# Patient Record
Sex: FEMALE | Race: OTHER | HISPANIC OR LATINO
[De-identification: names, ages, dates, MRNs, and addresses within clinical notes are randomized per-mention and may not be internally consistent; named-entity substitution may affect disease eponyms.]

---

## 2018-10-29 ENCOUNTER — APPOINTMENT (OUTPATIENT)
Dept: ANTEPARTUM | Facility: CLINIC | Age: 43
End: 2018-10-29
Payer: MEDICAID

## 2018-10-29 ENCOUNTER — ASOB RESULT (OUTPATIENT)
Age: 43
End: 2018-10-29

## 2018-10-29 PROBLEM — Z00.00 ENCOUNTER FOR PREVENTIVE HEALTH EXAMINATION: Status: ACTIVE | Noted: 2018-10-29

## 2018-10-29 PROCEDURE — 76856 US EXAM PELVIC COMPLETE: CPT

## 2019-05-16 ENCOUNTER — APPOINTMENT (OUTPATIENT)
Dept: PLASTIC SURGERY | Facility: CLINIC | Age: 44
End: 2019-05-16
Payer: MEDICAID

## 2019-05-16 VITALS — WEIGHT: 165 LBS | BODY MASS INDEX: 28.17 KG/M2 | HEIGHT: 64 IN

## 2019-05-16 DIAGNOSIS — Z78.9 OTHER SPECIFIED HEALTH STATUS: ICD-10-CM

## 2019-05-16 DIAGNOSIS — R73.03 PREDIABETES.: ICD-10-CM

## 2019-05-16 PROCEDURE — 99204 OFFICE O/P NEW MOD 45 MIN: CPT

## 2019-05-16 RX ORDER — METFORMIN HYDROCHLORIDE 625 MG/1
TABLET ORAL
Refills: 0 | Status: ACTIVE | COMMUNITY

## 2019-05-16 RX ORDER — SPIRONOLACTONE 50 MG/1
TABLET ORAL
Refills: 0 | Status: ACTIVE | COMMUNITY

## 2019-05-16 RX ORDER — LIRAGLUTIDE 6 MG/ML
INJECTION SUBCUTANEOUS
Refills: 0 | Status: ACTIVE | COMMUNITY

## 2019-05-18 NOTE — REASON FOR VISIT
[Consultation] : a consultation visit [FreeTextEntry1] : patient presents for a breast reconstruction consultation.

## 2019-05-18 NOTE — HISTORY OF PRESENT ILLNESS
[FreeTextEntry1] : 42 yo female presents for a breast reconstruction consultation.  The patient states she had a mammogram/sonogram followed by a biopsy two weeks ago that resulted in diagnosis of right breast cancer, IDC.  She states she went for an MRI today and results are pending.  She denies previous personal or family history of breast cancer.  Following discussion with her breast surgeon, she is considering lumpectomy vs. mastectomy, possible bilateral nipple-sparing.  She states she is overall healthy other than prediabetes.  She takes metformin, Victoza, and spironolactone.  She has previous surgical history of abdominal liposuction.  She has no children.  She does not smoke.

## 2019-05-18 NOTE — PHYSICAL EXAM
[NI] : Normal [de-identified] : sternal notch to nipple on the left is 25 cm and 24 cm on the right, the nipple to IMF is 10 cm on the left and 9.5 cm on the right, the base width is 13.5 cm bilaterally, there is grade 1 ptosis bilaterally.  The right IMF is lower than the left IMF.

## 2019-06-04 ENCOUNTER — RESULT REVIEW (OUTPATIENT)
Age: 44
End: 2019-06-04

## 2019-06-04 ENCOUNTER — OUTPATIENT (OUTPATIENT)
Dept: OUTPATIENT SERVICES | Facility: HOSPITAL | Age: 44
LOS: 1 days | Discharge: HOME | End: 2019-06-04
Payer: MEDICAID

## 2019-06-04 DIAGNOSIS — C50.919 MALIGNANT NEOPLASM OF UNSPECIFIED SITE OF UNSPECIFIED FEMALE BREAST: ICD-10-CM

## 2019-06-04 PROCEDURE — 88321 CONSLTJ&REPRT SLD PREP ELSWR: CPT

## 2019-06-07 ENCOUNTER — OUTPATIENT (OUTPATIENT)
Dept: OUTPATIENT SERVICES | Facility: HOSPITAL | Age: 44
LOS: 1 days | Discharge: HOME | End: 2019-06-07
Payer: MEDICAID

## 2019-06-07 VITALS
RESPIRATION RATE: 16 BRPM | TEMPERATURE: 98 F | OXYGEN SATURATION: 100 % | HEART RATE: 74 BPM | SYSTOLIC BLOOD PRESSURE: 103 MMHG | DIASTOLIC BLOOD PRESSURE: 75 MMHG

## 2019-06-07 VITALS
SYSTOLIC BLOOD PRESSURE: 100 MMHG | HEART RATE: 75 BPM | DIASTOLIC BLOOD PRESSURE: 63 MMHG | OXYGEN SATURATION: 99 % | WEIGHT: 156.09 LBS | TEMPERATURE: 98 F | RESPIRATION RATE: 15 BRPM

## 2019-06-07 LAB
GLUCOSE BLDC GLUCOMTR-MCNC: 80 MG/DL — SIGNIFICANT CHANGE UP (ref 70–99)
HCG UR QL: NEGATIVE — SIGNIFICANT CHANGE UP
SURGICAL PATHOLOGY STUDY: SIGNIFICANT CHANGE UP

## 2019-06-07 PROCEDURE — 36561 INSERT TUNNELED CV CATH: CPT

## 2019-06-07 PROCEDURE — 77001 FLUOROGUIDE FOR VEIN DEVICE: CPT | Mod: 26

## 2019-06-07 PROCEDURE — 76937 US GUIDE VASCULAR ACCESS: CPT | Mod: 26

## 2019-06-07 RX ORDER — SODIUM CHLORIDE 9 MG/ML
1000 INJECTION INTRAMUSCULAR; INTRAVENOUS; SUBCUTANEOUS
Refills: 0 | Status: DISCONTINUED | OUTPATIENT
Start: 2019-06-07 | End: 2019-06-22

## 2019-06-07 RX ORDER — ONDANSETRON 8 MG/1
4 TABLET, FILM COATED ORAL ONCE
Refills: 0 | Status: DISCONTINUED | OUTPATIENT
Start: 2019-06-07 | End: 2019-06-22

## 2019-06-07 RX ORDER — MUPIROCIN 20 MG/G
1 OINTMENT TOPICAL
Qty: 0 | Refills: 0 | DISCHARGE
Start: 2019-06-07 | End: 2019-06-11

## 2019-06-07 RX ORDER — OXYCODONE HYDROCHLORIDE 5 MG/1
5 TABLET ORAL ONCE
Refills: 0 | Status: DISCONTINUED | OUTPATIENT
Start: 2019-06-07 | End: 2019-06-07

## 2019-06-07 RX ORDER — HYDROMORPHONE HYDROCHLORIDE 2 MG/ML
0.5 INJECTION INTRAMUSCULAR; INTRAVENOUS; SUBCUTANEOUS
Refills: 0 | Status: DISCONTINUED | OUTPATIENT
Start: 2019-06-07 | End: 2019-06-07

## 2019-06-07 RX ORDER — ACETAMINOPHEN 500 MG
1000 TABLET ORAL ONCE
Refills: 0 | Status: DISCONTINUED | OUTPATIENT
Start: 2019-06-07 | End: 2019-06-22

## 2019-06-07 RX ORDER — MEPERIDINE HYDROCHLORIDE 50 MG/ML
12.5 INJECTION INTRAMUSCULAR; INTRAVENOUS; SUBCUTANEOUS
Refills: 0 | Status: DISCONTINUED | OUTPATIENT
Start: 2019-06-07 | End: 2019-06-07

## 2019-06-07 RX ADMIN — OXYCODONE HYDROCHLORIDE 5 MILLIGRAM(S): 5 TABLET ORAL at 10:54

## 2019-06-07 RX ADMIN — OXYCODONE HYDROCHLORIDE 5 MILLIGRAM(S): 5 TABLET ORAL at 11:24

## 2019-06-07 NOTE — PROVIDER CONTACT NOTE (OTHER) - SITUATION
pt drinking water on arrival. states she "only had a little" but about 1 cup missing from bottle.  bgm 80, asymptomatic.

## 2019-06-07 NOTE — ASU DISCHARGE PLAN (ADULT/PEDIATRIC) - NURSING INSTRUCTIONS
Review home care information sheet Begin with liquids and light food ( tea, toast, Jello, soups). Advance to what you normally eat. Liquids should taken in adequate amounts today.Refer to multi color post op discharge instruction sheet     CALL the DOCTOR:    -Fever greater than  101F  - Signs  of infection such as : increase pain,swelling,redness,or a bad  smell coming from the wound.  -Excessive amount of bleeding.  - Any pain that appears to be getting worse.  - Vomiting  -  If you have  not urinated 8 hours after surgery or have any difficulty urinating.     A responsible adult should be with you for the rest of the day and night for your safety and to help you if you needed.    Review attached FACT SHEET if applicable. For any problems or concerns,contact your doctor. [facility] patients should call the [facility]. If you cannot reach the doctor or clinic, call [facility] Emergency Department at [phone] or go to your local Emergency Department.  A responsible adult should be with you for the rest of the day and night for your safety and to help you if you needed. Resume your medications as listed on the attached Medication Record.

## 2019-06-07 NOTE — ASU DISCHARGE PLAN (ADULT/PEDIATRIC) - CALL YOUR DOCTOR IF YOU HAVE ANY OF THE FOLLOWING:
Pain not relieved by Medications/Bleeding that does not stop/Wound/Surgical Site with redness, or foul smelling discharge or pus/Swelling that gets worse/Fever greater than (need to indicate Fahrenheit or Celsius)

## 2019-06-07 NOTE — CHART NOTE - NSCHARTNOTEFT_GEN_A_CORE
Vascular & Interventional Radiology Post-Procedure Note    Pre-Procedure Diagnosis:  R breast ca  Post-Procedure Diagnosis: Same as pre.  Indications for Procedure:  chemotherapy    : MD Kay  Assistant(s):    Procedure Details/Findings:   8 fr power port placement via L IJ.    ready for use. tip at SVC/RA junction    Complications: none   Estimated Blood Loss: Minimal  Specimen: none  Contrast: none  Sedation: MAC  Patient Condition/Disposition:  PACU and then home  Plan:   port ready for use

## 2019-06-07 NOTE — ASU DISCHARGE PLAN (ADULT/PEDIATRIC) - ASU DC SPECIAL INSTRUCTIONSFT
Skin glue will fall off on its own. Do not peel it off.    Can shower tomorrow. Do not scrub incision. Let warm flow freely over incision. Pat dry.

## 2019-06-14 DIAGNOSIS — E11.9 TYPE 2 DIABETES MELLITUS WITHOUT COMPLICATIONS: ICD-10-CM

## 2019-06-14 DIAGNOSIS — C50.911 MALIGNANT NEOPLASM OF UNSPECIFIED SITE OF RIGHT FEMALE BREAST: ICD-10-CM

## 2019-06-14 DIAGNOSIS — E28.2 POLYCYSTIC OVARIAN SYNDROME: ICD-10-CM

## 2019-06-14 DIAGNOSIS — Z79.84 LONG TERM (CURRENT) USE OF ORAL HYPOGLYCEMIC DRUGS: ICD-10-CM

## 2019-06-14 DIAGNOSIS — C50.311 MALIGNANT NEOPLASM OF LOWER-INNER QUADRANT OF RIGHT FEMALE BREAST: ICD-10-CM

## 2019-06-14 DIAGNOSIS — Z17.0 ESTROGEN RECEPTOR POSITIVE STATUS [ER+]: ICD-10-CM

## 2019-10-21 PROBLEM — C50.919 MALIGNANT NEOPLASM OF UNSPECIFIED SITE OF UNSPECIFIED FEMALE BREAST: Chronic | Status: ACTIVE | Noted: 2019-06-07

## 2019-10-21 PROBLEM — E28.2 POLYCYSTIC OVARIAN SYNDROME: Chronic | Status: ACTIVE | Noted: 2019-06-07

## 2019-10-21 PROBLEM — R73.03 PREDIABETES: Chronic | Status: ACTIVE | Noted: 2019-06-07

## 2019-10-29 RX ORDER — SODIUM CHLORIDE 9 MG/ML
1000 INJECTION, SOLUTION INTRAVENOUS
Refills: 0 | Status: DISCONTINUED | OUTPATIENT
Start: 2019-10-30 | End: 2019-10-30

## 2019-10-29 RX ORDER — MEPERIDINE HYDROCHLORIDE 50 MG/ML
12.5 INJECTION INTRAMUSCULAR; INTRAVENOUS; SUBCUTANEOUS
Refills: 0 | Status: DISCONTINUED | OUTPATIENT
Start: 2019-10-30 | End: 2019-10-30

## 2019-10-29 RX ORDER — OXYCODONE HYDROCHLORIDE 5 MG/1
10 TABLET ORAL ONCE
Refills: 0 | Status: DISCONTINUED | OUTPATIENT
Start: 2019-10-30 | End: 2019-10-30

## 2019-10-29 RX ORDER — FENTANYL CITRATE 50 UG/ML
50 INJECTION INTRAVENOUS
Refills: 0 | Status: DISCONTINUED | OUTPATIENT
Start: 2019-10-30 | End: 2019-10-30

## 2019-10-29 RX ORDER — ONDANSETRON 8 MG/1
4 TABLET, FILM COATED ORAL ONCE
Refills: 0 | Status: DISCONTINUED | OUTPATIENT
Start: 2019-10-30 | End: 2019-10-30

## 2019-10-30 ENCOUNTER — OUTPATIENT (OUTPATIENT)
Dept: INPATIENT UNIT | Facility: HOSPITAL | Age: 44
LOS: 1 days | Discharge: HOME | End: 2019-10-30
Payer: MEDICAID

## 2019-10-30 ENCOUNTER — RESULT REVIEW (OUTPATIENT)
Age: 44
End: 2019-10-30

## 2019-10-30 VITALS
DIASTOLIC BLOOD PRESSURE: 52 MMHG | TEMPERATURE: 98 F | RESPIRATION RATE: 16 BRPM | HEART RATE: 88 BPM | HEIGHT: 64 IN | SYSTOLIC BLOOD PRESSURE: 106 MMHG | OXYGEN SATURATION: 100 % | WEIGHT: 167.99 LBS

## 2019-10-30 VITALS
OXYGEN SATURATION: 99 % | RESPIRATION RATE: 16 BRPM | HEART RATE: 76 BPM | SYSTOLIC BLOOD PRESSURE: 113 MMHG | TEMPERATURE: 98 F | DIASTOLIC BLOOD PRESSURE: 75 MMHG

## 2019-10-30 DIAGNOSIS — E28.2 POLYCYSTIC OVARIAN SYNDROME: ICD-10-CM

## 2019-10-30 DIAGNOSIS — N60.31 FIBROSCLEROSIS OF RIGHT BREAST: ICD-10-CM

## 2019-10-30 DIAGNOSIS — C50.919 MALIGNANT NEOPLASM OF UNSPECIFIED SITE OF UNSPECIFIED FEMALE BREAST: ICD-10-CM

## 2019-10-30 DIAGNOSIS — N63.10 UNSPECIFIED LUMP IN THE RIGHT BREAST, UNSPECIFIED QUADRANT: ICD-10-CM

## 2019-10-30 DIAGNOSIS — C50.911 MALIGNANT NEOPLASM OF UNSPECIFIED SITE OF RIGHT FEMALE BREAST: ICD-10-CM

## 2019-10-30 DIAGNOSIS — Z98.890 OTHER SPECIFIED POSTPROCEDURAL STATES: Chronic | ICD-10-CM

## 2019-10-30 DIAGNOSIS — R73.03 PREDIABETES: ICD-10-CM

## 2019-10-30 DIAGNOSIS — N60.21 FIBROADENOSIS OF RIGHT BREAST: ICD-10-CM

## 2019-10-30 LAB — HCG UR QL: NEGATIVE — SIGNIFICANT CHANGE UP

## 2019-10-30 PROCEDURE — 88307 TISSUE EXAM BY PATHOLOGIST: CPT | Mod: 26

## 2019-10-30 PROCEDURE — 88305 TISSUE EXAM BY PATHOLOGIST: CPT | Mod: 26

## 2019-10-30 PROCEDURE — 88305 TISSUE EXAM BY PATHOLOGIST: CPT

## 2019-10-30 PROCEDURE — 76098 X-RAY EXAM SURGICAL SPECIMEN: CPT

## 2019-10-30 PROCEDURE — A9520: CPT

## 2019-10-30 PROCEDURE — 81025 URINE PREGNANCY TEST: CPT

## 2019-10-30 PROCEDURE — 19281 PERQ DEVICE BREAST 1ST IMAG: CPT | Mod: RT

## 2019-10-30 PROCEDURE — C1889: CPT

## 2019-10-30 PROCEDURE — 88307 TISSUE EXAM BY PATHOLOGIST: CPT

## 2019-10-30 PROCEDURE — 76098 X-RAY EXAM SURGICAL SPECIMEN: CPT | Mod: 26

## 2019-10-30 PROCEDURE — 82962 GLUCOSE BLOOD TEST: CPT

## 2019-10-30 RX ORDER — OXYCODONE HYDROCHLORIDE 5 MG/1
5 TABLET ORAL ONCE
Refills: 0 | Status: DISCONTINUED | OUTPATIENT
Start: 2019-10-30 | End: 2019-10-30

## 2019-10-30 RX ORDER — LIRAGLUTIDE 6 MG/ML
1.2 INJECTION SUBCUTANEOUS
Qty: 0 | Refills: 0 | DISCHARGE

## 2019-10-30 RX ADMIN — FENTANYL CITRATE 50 MICROGRAM(S): 50 INJECTION INTRAVENOUS at 15:19

## 2019-10-30 RX ADMIN — FENTANYL CITRATE 50 MICROGRAM(S): 50 INJECTION INTRAVENOUS at 15:20

## 2019-10-30 RX ADMIN — SODIUM CHLORIDE 100 MILLILITER(S): 9 INJECTION, SOLUTION INTRAVENOUS at 16:04

## 2019-10-30 RX ADMIN — OXYCODONE HYDROCHLORIDE 5 MILLIGRAM(S): 5 TABLET ORAL at 15:20

## 2019-10-30 NOTE — ASU DISCHARGE PLAN (ADULT/PEDIATRIC) - CARE PROVIDER_API CALL
Rafi Barrera)  Surgery  [address] [place] [address]  Phone: [phone]  Fax: [phone]  Follow Up Time:

## 2019-10-30 NOTE — BRIEF OPERATIVE NOTE - NSICDXBRIEFPROCEDURE_GEN_ALL_CORE_FT
PROCEDURES:  Excisional biopsy, sentinel lymph node, axillary, deep 30-Oct-2019 14:57:18  Rafi Barrera  Right breast lumpectomy 30-Oct-2019 14:57:01  Rafi Barrera

## 2019-11-18 ENCOUNTER — APPOINTMENT (OUTPATIENT)
Dept: PLASTIC SURGERY | Facility: CLINIC | Age: 44
End: 2019-11-18
Payer: MEDICAID

## 2019-11-18 VITALS
HEIGHT: 64 IN | SYSTOLIC BLOOD PRESSURE: 122 MMHG | BODY MASS INDEX: 29.24 KG/M2 | DIASTOLIC BLOOD PRESSURE: 86 MMHG | TEMPERATURE: 98 F | WEIGHT: 171.31 LBS | HEART RATE: 94 BPM

## 2019-11-18 DIAGNOSIS — C50.911 MALIGNANT NEOPLASM OF UNSPECIFIED SITE OF RIGHT FEMALE BREAST: ICD-10-CM

## 2019-11-18 PROCEDURE — 99212 OFFICE O/P EST SF 10 MIN: CPT

## 2020-07-06 NOTE — ASU PATIENT PROFILE, ADULT - PAIN SCALE PREFERRED, PROFILE
COLONOSCOPY/EGD: PLENVU     Re: Raissa Gomez    Provider: Adam Kang MD    Your colon must be cleaned of stool in order to have a good view. You should follow these directions in order to have a successful colonoscopy.    IF YOU DO  NOT HAVE AN ADULT TO DRIVE YOU HOME, YOUR SURGERY WILL BE CANCELLED.     Your exam is scheduled as an outpatient procedure on:     Day: Thursday     Date: October 08, 2020    Arrival Time: 8:45 AM    Location: [facility], [address] [place] [id number] - Directions: Come all the way into the main lobby of the building and take the interior elevator down to the lower level. Turn left off the elevator and walk straight ahead to the Endoscopy reception area.     You will need the following in order to complete your prep:  1. PLENVU prep with one mixing container- with one packet for the first part and two packets for the second part of the prep  2. Two Simethicone tablets (OVER THE COUNTER)  3. Two Dulcolax (Bisacodyl) tablets (OVER THE COUNTER)    Your prep kit has been has been sent to   [facility]/pharmacy #[id number] - [address] [facility] [address]  Phone: [phone] Fax: [phone]   Please  the kit today.            7 days before colonoscopy: Review your colonoscopy instructions. (Instructions can also be found on [facility])  • Arrange a ride: you will be given medicine that makes you relax and sleepy, so you cannot drive a car or take a bus/taxi home. If you arrive without an 18 year or older escort, your procedure will need to be rescheduled. Your  MUST stay with you for the duration of the procedure.  • Stop eating popcorn, nuts, flax/sesame seeds, or any food that contains seeds        3 BUSINESS DAYS BEFORE your procedure:  • Confirm your ride. Your  MUST stay with you for the duration of the procedure.  • If you need to cancel your appointment, call your doctor or nurse at  [phone] to avoid a cancellation fee.  • Stop taking all anti-inflammatory medicines. These include: Advil, Aleve, Naprosyn, (Tylenol is okay to take)  • Review the diet you need to follow for the next 2 days. Plan your meals according to this diet.      1 DAY BEFORE the procedure:     If you are diabetic: Not applicable    •Start a strict, clear liquid diet from the moment you wake. A clear liquid diet can include: Apple juice, white grape juice, and white cranberry juice; Beef or chicken broths that are clear - no noodles, vegetables, rice, etc; Tea and coffee without milk; -Soda pop, Gatorade, Tonny-Aid and various -Jell-O Flavors (any color except red or purple)  •Avoid: juices with pulp, milk, cream, solid food, alcohol, and hard candy.    Prep can be refrigerate up to 6 hours after mixing.     6:00pm: Pour ONE packet (DOSE 1 Pouch) of Plenvu into mixing container.  Add cooling water to the 16 ounce line on the container and mix. Drink ALL the liquid in the container within 30 minutes. You MUST drink 1 more 16 ounces of water/clear liquids over the next 30 minutes - use your disposable container as a guide. Chew one Simethicone (GasX) tablet.   • The laxative liquid will cause you to have many bowel movements. This is needed so that you will have a clean colon for the procedure. Plan to stay home for the duration of the prep.  If you have problems completing this preparation, call your doctor or nurse at [phone].   Continue to drink clear liquids throughout the evening but do not eat any solid food.    ON THE DAY OF the procedure:    •2:45 AM : Pour second two pouches (DOSE 2- Pouch A and pouch B) of Plenvu into the mixing container.  Add cooling water to the 16 ounce line on the container and mix. Drink ALL the liquid in the container within 30 minutes. You MUST drink 1 more 16 ounces of water/clear liquids over the next 30 minutes-use your disposable container as a guide. Chew one Simethicone (GasX)  tablet and swallow two Dulcolax (Bisacodyl) tablets.    Stop all clear liquids including water at 4:45 AM     • Take your medications;buPROPion XL (WELLBUTRIN XL) 300 MG 24 hr tablet, lisinopril (ZESTRIL) 20 MG tablet if needing to take in the AM  with a sip of water before 4:45AM. You cannot have anything by mouth including water at this time.    Please call our office if you have been prescribed any new medication between now and your procedure    If you are still having solid/formed stools please call the doctor's office at [phone].   • Bring your escort/ with you to your appointment. Remember the escort must be 18 years or older.      Bring photo ID, current insurance card, inhalers or nebulizers and an updated list of your current medications with you to your procedure. You will be at the Clinic or hospital approximately 2-3 hours. Do not wear any jewelry, contact lenses, or bring any valuables. A pregnancy test is required if you are female and under the age of 56. Be aware that there is a chance that your procedure time may be changed.     numerical 0-10

## 2020-07-14 ENCOUNTER — APPOINTMENT (OUTPATIENT)
Dept: ANTEPARTUM | Facility: CLINIC | Age: 45
End: 2020-07-14
Payer: MEDICAID

## 2020-07-14 ENCOUNTER — ASOB RESULT (OUTPATIENT)
Age: 45
End: 2020-07-14

## 2020-07-14 PROCEDURE — 76830 TRANSVAGINAL US NON-OB: CPT

## 2020-07-14 PROCEDURE — 76856 US EXAM PELVIC COMPLETE: CPT | Mod: 59

## 2021-01-27 ENCOUNTER — ASOB RESULT (OUTPATIENT)
Age: 46
End: 2021-01-27

## 2021-01-27 ENCOUNTER — APPOINTMENT (OUTPATIENT)
Dept: ANTEPARTUM | Facility: CLINIC | Age: 46
End: 2021-01-27
Payer: MEDICAID

## 2021-01-27 PROCEDURE — 76830 TRANSVAGINAL US NON-OB: CPT

## 2021-01-27 PROCEDURE — 99072 ADDL SUPL MATRL&STAF TM PHE: CPT

## 2021-01-27 PROCEDURE — 76857 US EXAM PELVIC LIMITED: CPT | Mod: 59

## 2021-07-27 ENCOUNTER — APPOINTMENT (OUTPATIENT)
Dept: ANTEPARTUM | Facility: CLINIC | Age: 46
End: 2021-07-27

## 2021-08-03 ENCOUNTER — APPOINTMENT (OUTPATIENT)
Dept: ANTEPARTUM | Facility: CLINIC | Age: 46
End: 2021-08-03
Payer: MEDICAID

## 2021-08-03 ENCOUNTER — ASOB RESULT (OUTPATIENT)
Age: 46
End: 2021-08-03

## 2021-08-03 PROCEDURE — 76856 US EXAM PELVIC COMPLETE: CPT | Mod: 59

## 2021-08-03 PROCEDURE — 76830 TRANSVAGINAL US NON-OB: CPT

## 2022-02-23 NOTE — ASU PATIENT PROFILE, ADULT - AS SC BRADEN SENSORY
Spoke to patient he would like medication ordered. Advised patient of dosage amount and instructions.    Patient verbalized understanding     (4) no impairment

## 2022-06-15 ENCOUNTER — APPOINTMENT (OUTPATIENT)
Dept: ANTEPARTUM | Facility: CLINIC | Age: 47
End: 2022-06-15

## 2022-06-21 ENCOUNTER — APPOINTMENT (OUTPATIENT)
Dept: ANTEPARTUM | Facility: CLINIC | Age: 47
End: 2022-06-21

## 2022-06-28 ENCOUNTER — APPOINTMENT (OUTPATIENT)
Dept: ANTEPARTUM | Facility: CLINIC | Age: 47
End: 2022-06-28

## 2023-03-24 NOTE — ASU PREOP CHECKLIST - AS BP NONINV SITE
Follow up with Dr. Rebecca Tidwell in office in 1 week. Please call for appointment      Eat a heart healthy diet  Stay well hydrated    Keep surgical dressings clen & dry  Wet/dry dressing changes daily, and as necessary if soiled  Continue to wear surgical bra 24 hour per day, until your follow up appointment with Dr. Rebecca Tidwell  No Showers or Tub baths. Sponge baths only              It has been a pleasure taking care of you! Best wishes for a speedy recovery!   Maria E SIMONS
right upper arm

## 2023-04-12 NOTE — ASU PREOP CHECKLIST - BP NONINVASIVE DIASTOLIC (MM HG)
Female: TSH, Prolactin, SHG/TET, MFM consult d/t incompetent cervix    Patient to take HPT, if negative will call for Provera 10mg x10 days    If SHG/TET is normal, patient can call with day one to complete ID ( as well)  and prenatal labs and be mapped for FET.   63

## 2024-01-11 ENCOUNTER — APPOINTMENT (OUTPATIENT)
Dept: BREAST CENTER | Facility: CLINIC | Age: 49
End: 2024-01-11
Payer: COMMERCIAL

## 2024-01-11 VITALS — RESPIRATION RATE: 16 BRPM | WEIGHT: 151 LBS | BODY MASS INDEX: 25.78 KG/M2 | HEIGHT: 64 IN

## 2024-01-11 DIAGNOSIS — N64.89 OTHER SPECIFIED DISORDERS OF BREAST: ICD-10-CM

## 2024-01-11 PROCEDURE — 76642 ULTRASOUND BREAST LIMITED: CPT

## 2024-01-11 PROCEDURE — 99213 OFFICE O/P EST LOW 20 MIN: CPT | Mod: 25

## 2024-01-11 RX ORDER — TAMOXIFEN CITRATE 20 MG/1
TABLET, FILM COATED ORAL
Refills: 0 | Status: ACTIVE | COMMUNITY

## 2024-01-15 PROBLEM — N64.89 BREAST ASYMMETRY: Status: ACTIVE | Noted: 2024-01-11

## 2024-01-15 NOTE — REASON FOR VISIT
[Follow-Up: _____] : a [unfilled] follow-up visit [FreeTextEntry1] : 1 year follow up. Denies new breast pain and new breast mass. Last mammogram performed 1 week ago at [facility].

## 2024-01-15 NOTE — HISTORY OF PRESENT ILLNESS
[FreeTextEntry1] : Patient returns to the office for interval assessment  Recent mammogram demonstrates asymmetry right breast  Has a history for right breast cancer  Follow-up diagnostic imaging suggested by the radiologist  Patient advised to do this  She denies palpable breast mass, pain, skin thickening

## 2024-01-15 NOTE — ASSESSMENT
[FreeTextEntry1] : History for right breast cancer  Possible asymmetry identified on mammogram  Patient advised follow-up diagnostic imaging with radiologist  Follow-up with me in 6 months  A total of 20 minutes was spent in consultation, evaluation, review

## 2024-01-15 NOTE — PROCEDURE
[FreeTextEntry3] : Bilateral breast ultrasound  The patient has a history for right breast cancer  There are stable asymmetry in the right breast  There is no suspicious lymphadenopathy  Four-quadrant survey the right breast demonstrates no developing mass  Four-quadrant survey the left breast demonstrates no developing mass  There is no suspicious lymphadenopathy  BI-RADS 2

## 2024-01-25 ENCOUNTER — TRANSCRIPTION ENCOUNTER (OUTPATIENT)
Age: 49
End: 2024-01-25

## 2024-04-12 ENCOUNTER — APPOINTMENT (OUTPATIENT)
Dept: BREAST CENTER | Facility: CLINIC | Age: 49
End: 2024-04-12

## 2024-04-24 NOTE — ASU PREOP CHECKLIST - BMI (KG/M2)
----- Message from Kaushik Lares sent at 4/24/2024  2:25 PM CDT -----  Contact: pt  Type:  Patient Returning Call    Who Called:pt  Who Left Message for Patient:office  Does the patient know what this is regarding?:no  Would the patient rather a call back or a response via [facility]? call  Best Call Back Number: [phone]  Additional Information:   28.8

## 2024-07-30 ENCOUNTER — APPOINTMENT (OUTPATIENT)
Dept: ANTEPARTUM | Facility: CLINIC | Age: 49
End: 2024-07-30
Payer: COMMERCIAL

## 2024-07-30 ENCOUNTER — ASOB RESULT (OUTPATIENT)
Age: 49
End: 2024-07-30

## 2024-07-30 PROCEDURE — 76830 TRANSVAGINAL US NON-OB: CPT

## 2024-07-30 PROCEDURE — 76856 US EXAM PELVIC COMPLETE: CPT | Mod: 59

## 2024-08-13 ENCOUNTER — NON-APPOINTMENT (OUTPATIENT)
Age: 49
End: 2024-08-13

## 2024-08-28 ENCOUNTER — APPOINTMENT (OUTPATIENT)
Dept: BREAST CENTER | Facility: CLINIC | Age: 49
End: 2024-08-28

## 2025-01-28 ENCOUNTER — NON-APPOINTMENT (OUTPATIENT)
Age: 50
End: 2025-01-28

## 2025-02-03 ENCOUNTER — APPOINTMENT (OUTPATIENT)
Dept: BREAST CENTER | Facility: CLINIC | Age: 50
End: 2025-02-03
Payer: COMMERCIAL

## 2025-02-03 VITALS
DIASTOLIC BLOOD PRESSURE: 77 MMHG | SYSTOLIC BLOOD PRESSURE: 112 MMHG | BODY MASS INDEX: 19.95 KG/M2 | HEART RATE: 111 BPM | HEIGHT: 64 IN | WEIGHT: 116.84 LBS

## 2025-02-03 DIAGNOSIS — N64.89 OTHER SPECIFIED DISORDERS OF BREAST: ICD-10-CM

## 2025-02-03 PROCEDURE — 76641 ULTRASOUND BREAST COMPLETE: CPT | Mod: 50

## 2025-02-03 PROCEDURE — 99213 OFFICE O/P EST LOW 20 MIN: CPT | Mod: 25

## 2025-02-03 RX ORDER — MUPIROCIN 20 MG/G
2 OINTMENT TOPICAL 3 TIMES DAILY
Qty: 1 | Refills: 0 | Status: ACTIVE | COMMUNITY
Start: 2025-02-03 | End: 1900-01-01

## 2025-02-11 ENCOUNTER — NON-APPOINTMENT (OUTPATIENT)
Age: 50
End: 2025-02-11

## 2025-09-17 ENCOUNTER — APPOINTMENT (OUTPATIENT)
Dept: BREAST CENTER | Facility: CLINIC | Age: 50
End: 2025-09-17